# Patient Record
Sex: FEMALE | Race: WHITE | NOT HISPANIC OR LATINO | URBAN - METROPOLITAN AREA
[De-identification: names, ages, dates, MRNs, and addresses within clinical notes are randomized per-mention and may not be internally consistent; named-entity substitution may affect disease eponyms.]

---

## 2023-12-14 ENCOUNTER — OFFICE (OUTPATIENT)
Dept: URBAN - METROPOLITAN AREA CLINIC 19 | Facility: CLINIC | Age: 53
End: 2023-12-14
Payer: COMMERCIAL

## 2023-12-14 VITALS
OXYGEN SATURATION: 99 % | HEIGHT: 62 IN | SYSTOLIC BLOOD PRESSURE: 136 MMHG | WEIGHT: 105 LBS | HEART RATE: 75 BPM | DIASTOLIC BLOOD PRESSURE: 67 MMHG

## 2023-12-14 DIAGNOSIS — R10.31 RIGHT LOWER QUADRANT PAIN: ICD-10-CM

## 2023-12-14 DIAGNOSIS — Z79.1 LONG TERM (CURRENT) USE OF NON-STEROIDAL ANTI-INFLAMMATORIES: ICD-10-CM

## 2023-12-14 DIAGNOSIS — Z86.69 PERSONAL HISTORY OF OTHER DISEASES OF THE NERVOUS SYSTEM AND: ICD-10-CM

## 2023-12-14 DIAGNOSIS — K59.04 CHRONIC IDIOPATHIC CONSTIPATION: ICD-10-CM

## 2023-12-14 PROCEDURE — 99204 OFFICE O/P NEW MOD 45 MIN: CPT

## 2023-12-14 NOTE — SERVICENOTES
MD Addendum: I, Dulce Mckenna MD, independently interviewed and examined this patient and made modifications to the final HPI, exam, impression, and plan as initially scribed by Mohini Espino NP.

## 2023-12-14 NOTE — SERVICEHPINOTES
53-year-old female is here for evaluation of chronic constipation. Patient has a history of cerebral palsy and is in a wheelchair and is nonverbal.  Accompanied by her mother and caregiver.  Mother informs me they were evaluated at [facility] for constipation where she had a KUB and received an enema which relieved her constipation and abdominal pain.  Currently taking MiraLax and fiber supplements daily.  Using Fleet enemas multiple times per week along with Dulcolax suppositories as needed.  Does not drink adequate water.  History of hyperthyroidism and remains on levothyroxine daily.  Taking meloxicam as needed for recent shoulder complication.  Denies any upper GI symptoms.  Has occasional bilateral lower abdominal pain.  Denies abnormal weight loss.  No family history of colon cancer, colon polyps, or IBD.  No cardiac issues, not taking any diet pills or blood thinners.

## 2023-12-15 LAB
CREATININE: 0.64 MG/DL (ref 0.57–1)
CREATININE: EGFR: 106 ML/MIN/1.73 (ref 59–?)

## 2024-02-13 ENCOUNTER — OFFICE (OUTPATIENT)
Dept: URBAN - METROPOLITAN AREA CLINIC 19 | Facility: CLINIC | Age: 54
End: 2024-02-13
Payer: COMMERCIAL

## 2024-02-13 VITALS
DIASTOLIC BLOOD PRESSURE: 67 MMHG | OXYGEN SATURATION: 98 % | SYSTOLIC BLOOD PRESSURE: 114 MMHG | HEIGHT: 62 IN | HEART RATE: 86 BPM | WEIGHT: 105 LBS

## 2024-02-13 DIAGNOSIS — R10.31 RIGHT LOWER QUADRANT PAIN: ICD-10-CM

## 2024-02-13 DIAGNOSIS — Z86.69 PERSONAL HISTORY OF OTHER DISEASES OF THE NERVOUS SYSTEM AND: ICD-10-CM

## 2024-02-13 DIAGNOSIS — K58.1 IRRITABLE BOWEL SYNDROME WITH CONSTIPATION: ICD-10-CM

## 2024-02-13 PROCEDURE — 99213 OFFICE O/P EST LOW 20 MIN: CPT

## 2024-02-13 NOTE — SERVICEHPINOTES
53-year-old female is here for a follow up visit. Evaluated in December 2023 for bilateral lower abdominal pain and constipation.  Labs at that time revealed normal creatinine.  CT A/P with contrast revealed moderate fecal burden within the colon.  Normal liver and pancreas.  Multiple large gallstones noted in the gallbladder without pericholecystic fluid or wall thickening.  A 0.8 cm hypodensity nodule consistent with either a small cyst or hemangioma within in the spleen. A 1.2 cm low density lesion in the right kidney. Normal left kidney. Taking Linzess 290 mcg every other day and reports bowel movements are regular. Patient has a history of cerebral palsy and is in a wheelchair and is nonverbal.  Does not drink adequate water.  History of hypothyroidism and remains on levothyroxine daily. Denies any upper GI symptoms. Weight appears stable. No family history of colon cancer, colon polyps, or IBD.  No cardiac issues, not taking any weight loss medications or blood thinners. Accompanied by her mother and caregiver.   KUB done at [facility] revealed moderate fecal stasis in November 2023.

## 2024-08-13 ENCOUNTER — OFFICE (OUTPATIENT)
Dept: URBAN - METROPOLITAN AREA CLINIC 19 | Facility: CLINIC | Age: 54
End: 2024-08-13
Payer: MEDICARE

## 2024-08-13 VITALS
HEART RATE: 72 BPM | SYSTOLIC BLOOD PRESSURE: 129 MMHG | WEIGHT: 108 LBS | HEIGHT: 62 IN | DIASTOLIC BLOOD PRESSURE: 68 MMHG

## 2024-08-13 DIAGNOSIS — K58.1 IRRITABLE BOWEL SYNDROME WITH CONSTIPATION: ICD-10-CM

## 2024-08-13 DIAGNOSIS — Z86.69 PERSONAL HISTORY OF OTHER DISEASES OF THE NERVOUS SYSTEM AND: ICD-10-CM

## 2024-08-13 PROCEDURE — 99213 OFFICE O/P EST LOW 20 MIN: CPT

## 2024-08-13 RX ORDER — LINACLOTIDE 290 UG/1
CAPSULE, GELATIN COATED ORAL
Qty: 90 | Refills: 3 | Status: ACTIVE
Start: 2023-12-22

## 2025-08-20 ENCOUNTER — OFFICE (OUTPATIENT)
Dept: URBAN - METROPOLITAN AREA CLINIC 19 | Facility: CLINIC | Age: 55
End: 2025-08-20
Payer: MEDICARE

## 2025-08-20 VITALS
SYSTOLIC BLOOD PRESSURE: 116 MMHG | OXYGEN SATURATION: 99 % | DIASTOLIC BLOOD PRESSURE: 59 MMHG | HEART RATE: 76 BPM | WEIGHT: 108 LBS | HEIGHT: 62 IN

## 2025-08-20 DIAGNOSIS — K58.1 IRRITABLE BOWEL SYNDROME WITH CONSTIPATION: ICD-10-CM

## 2025-08-20 PROCEDURE — 99213 OFFICE O/P EST LOW 20 MIN: CPT

## 2025-08-20 RX ORDER — LINACLOTIDE 290 UG/1
CAPSULE, GELATIN COATED ORAL
Qty: 90 | Refills: 3 | Status: ACTIVE
Start: 2023-12-22